# Patient Record
Sex: FEMALE | Race: WHITE
[De-identification: names, ages, dates, MRNs, and addresses within clinical notes are randomized per-mention and may not be internally consistent; named-entity substitution may affect disease eponyms.]

---

## 2017-04-17 ENCOUNTER — HOSPITAL ENCOUNTER (OUTPATIENT)
Dept: HOSPITAL 18 - NAVSJIPCSP | Age: 62
End: 2017-04-17
Payer: COMMERCIAL

## 2017-04-17 DIAGNOSIS — R39.9: Primary | ICD-10-CM

## 2017-04-17 LAB
ANION GAP SERPL CALC-SCNC: 15 MMOL/L (ref 10–20)
BASOPHILS # BLD AUTO: 0.2 THOU/UL (ref 0–0.2)
BASOPHILS NFR BLD AUTO: 1.1 % (ref 0–1)
BUN SERPL-MCNC: 16 MG/DL (ref 9.8–20.1)
CALCIUM SERPL-MCNC: 9.7 MG/DL (ref 7.8–10.44)
CHLORIDE SERPL-SCNC: 100 MMOL/L (ref 98–107)
CO2 SERPL-SCNC: 25 MMOL/L (ref 23–31)
CREAT CL PREDICTED SERPL C-G-VRATE: 0 ML/MIN (ref 70–130)
EOSINOPHIL # BLD AUTO: 0.2 THOU/UL (ref 0–0.7)
EOSINOPHIL NFR BLD AUTO: 1.3 % (ref 0–10)
GLUCOSE SERPL-MCNC: 143 MG/DL (ref 80–115)
HGB BLD-MCNC: 10.8 G/DL (ref 12–16)
LYMPHOCYTES # BLD AUTO: 2.7 THOU/UL (ref 1.2–3.4)
LYMPHOCYTES NFR BLD AUTO: 15 % (ref 21–51)
MCH RBC QN AUTO: 25.8 PG (ref 27–31)
MCV RBC AUTO: 83.5 FL (ref 81–99)
MONOCYTES # BLD AUTO: 1.3 THOU/UL (ref 0.11–0.59)
MONOCYTES NFR BLD AUTO: 7.2 % (ref 0–10)
NEUTROPHILS # BLD AUTO: 13.3 THOU/UL (ref 1.4–6.5)
NEUTROPHILS NFR BLD AUTO: 75.4 % (ref 42–75)
PLATELET # BLD AUTO: 436 THOU/UL (ref 130–400)
POTASSIUM SERPL-SCNC: 4.5 MMOL/L (ref 3.5–5.1)
RBC # BLD AUTO: 4.21 MILL/UL (ref 4.2–5.4)
RBC UR QL AUTO: (no result) HPF (ref 0–3)
SODIUM SERPL-SCNC: 135 MMOL/L (ref 136–145)
SP GR UR STRIP: 1.02 (ref 1–1.04)
UNIDENT CRYS #/AREA URNS HPF: (no result) HPF
WBC # BLD AUTO: 17.6 THOU/UL (ref 4.8–10.8)

## 2017-04-17 PROCEDURE — 85025 COMPLETE CBC W/AUTO DIFF WBC: CPT

## 2017-04-17 PROCEDURE — 80048 BASIC METABOLIC PNL TOTAL CA: CPT

## 2017-04-17 PROCEDURE — 81015 MICROSCOPIC EXAM OF URINE: CPT

## 2017-04-17 PROCEDURE — 87086 URINE CULTURE/COLONY COUNT: CPT

## 2017-04-17 PROCEDURE — 81003 URINALYSIS AUTO W/O SCOPE: CPT

## 2017-04-29 ENCOUNTER — HOSPITAL ENCOUNTER (EMERGENCY)
Dept: HOSPITAL 18 - NAV ERS | Age: 62
LOS: 1 days | Discharge: HOME | End: 2017-04-30
Payer: COMMERCIAL

## 2017-04-29 DIAGNOSIS — M81.0: ICD-10-CM

## 2017-04-29 DIAGNOSIS — R11.2: Primary | ICD-10-CM

## 2017-04-29 DIAGNOSIS — I10: ICD-10-CM

## 2017-04-29 DIAGNOSIS — Z79.899: ICD-10-CM

## 2017-04-29 LAB
ALBUMIN SERPL BCG-MCNC: 3.9 G/DL (ref 3.4–4.8)
ALP SERPL-CCNC: 81 U/L (ref 40–150)
ALT SERPL W P-5'-P-CCNC: 12 U/L (ref 0–55)
ANION GAP SERPL CALC-SCNC: 21 MMOL/L (ref 10–20)
AST SERPL-CCNC: 19 U/L (ref 5–34)
BILIRUB SERPL-MCNC: 0.4 MG/DL (ref 0.2–1.2)
BUN SERPL-MCNC: 11 MG/DL (ref 9.8–20.1)
CALCIUM SERPL-MCNC: 10.5 MG/DL (ref 7.8–10.44)
CHLORIDE SERPL-SCNC: 103 MMOL/L (ref 98–107)
CK MB SERPL-MCNC: 1.5 NG/ML (ref 0–6.6)
CO2 SERPL-SCNC: 17 MMOL/L (ref 23–31)
CREAT CL PREDICTED SERPL C-G-VRATE: 0 ML/MIN (ref 70–130)
GLOBULIN SER CALC-MCNC: 5.4 G/DL (ref 2.4–3.5)
GLUCOSE SERPL-MCNC: 153 MG/DL (ref 80–115)
HGB BLD-MCNC: 13 G/DL (ref 12–16)
LIPASE SERPL-CCNC: 32 U/L (ref 8–78)
MCH RBC QN AUTO: 25.4 PG (ref 27–31)
MCV RBC AUTO: 80.8 FL (ref 81–99)
MDIFF COMPLETE?: YES
PLATELET # BLD AUTO: 427 THOU/UL (ref 130–400)
PLATELET BLD QL SMEAR: (no result)
POTASSIUM SERPL-SCNC: 3.8 MMOL/L (ref 3.5–5.1)
RBC # BLD AUTO: 5.1 MILL/UL (ref 4.2–5.4)
SODIUM SERPL-SCNC: 137 MMOL/L (ref 136–145)
SP GR UR STRIP: 1 (ref 1–1.04)
TROPONIN I SERPL DL<=0.01 NG/ML-MCNC: 0.01 NG/ML (ref ?–0.03)
WBC # BLD AUTO: 21 THOU/UL (ref 4.8–10.8)

## 2017-04-29 PROCEDURE — 84484 ASSAY OF TROPONIN QUANT: CPT

## 2017-04-29 PROCEDURE — 96374 THER/PROPH/DIAG INJ IV PUSH: CPT

## 2017-04-29 PROCEDURE — 85025 COMPLETE CBC W/AUTO DIFF WBC: CPT

## 2017-04-29 PROCEDURE — 80053 COMPREHEN METABOLIC PANEL: CPT

## 2017-04-29 PROCEDURE — 93005 ELECTROCARDIOGRAM TRACING: CPT

## 2017-04-29 PROCEDURE — 74177 CT ABD & PELVIS W/CONTRAST: CPT

## 2017-04-29 PROCEDURE — 96361 HYDRATE IV INFUSION ADD-ON: CPT

## 2017-04-29 PROCEDURE — 83690 ASSAY OF LIPASE: CPT

## 2017-04-29 PROCEDURE — 82553 CREATINE MB FRACTION: CPT

## 2017-04-29 PROCEDURE — 36415 COLL VENOUS BLD VENIPUNCTURE: CPT

## 2017-04-29 PROCEDURE — 81003 URINALYSIS AUTO W/O SCOPE: CPT

## 2017-04-29 PROCEDURE — 96375 TX/PRO/DX INJ NEW DRUG ADDON: CPT

## 2017-04-29 PROCEDURE — 81015 MICROSCOPIC EXAM OF URINE: CPT

## 2017-04-29 PROCEDURE — 83605 ASSAY OF LACTIC ACID: CPT

## 2017-04-29 NOTE — CT
EXAM:

ABDOMEN CT WITH CONTRAST

PELVIC CT WITH CONTRAST

4/29/17

 

HISTORY: 

Abdominal pain. Crohn's disease. Vulvar adenocarcinoma. 

 

COMPARISON:  

6/23/16. 1/21/15.

 

TECHNIQUE:  

Abdomen and pelvic CT are performed with IV contrast. Coronal reformatted images are submitted for i
nterpretation.

 

FINDINGS:  

 

ABDOMEN CT:

The lung bases are clear. Heart size is upper normal. No pericardial effusion. Descending thoracic a
junaid and the abdominal aorta have a normal caliber. No periaortic fat stranding. 

 

Symmetric attenuation of the psoas muscles. Spleen is surgically absent. Splenule is noted. The panc
reas and adrenal glands have a appropriate enhancement. 

 

There is appropriate enhancement of the liver. There is some nodularity along the right hepatic lobe
 as well as the posterior peritoneum adjacent to the posterior segment of the right hepatic lobe. Th
azul densities are similar to the prior examination and may represent small splenules. 

 

Symmetric enhancement of the kidneys. Bilaterally, no obstructive uropathy. Note, the entire ureter 
is not appreciated due to extensive surgical clips in the pelvis. 

 

No mesenteric mass, lymphadenopathy, free air or free fluid. Stable ostomy in the right lower quadra
nt. 

 

Multiple proximal normal caliber small bowel loops. There does appear to be a transition in the over
all diameter of the small bowel loops (axial image #42, coronal image #34). Beyond the level of this
 transition, there are multiple fluid filled loops of bowel. Obstructive process cannot be completel
y excluded. 

 

Note, there are distal small bowel loops which are decompressed. There is a nonspecific hyperdensity
 in a segment of small bowel measuring 1.2 cm. 

 

PELVIC CT:

Limited evaluation due to beam attenuation artifact from surgical clips and a right hip prosthesis. 
Unrinary bladder is grossly unremarkable. There are multiple fluid filled loops of small bowel. 

 

IMPRESSION:  

1.      Extensive postsurgical changes in the pelvis rendering limited evaluation. There appear to b
e fluid filled loops of prominent small bowel, in the mid portion of the small bowel. Obstructive pr
ocess cannot be excluded. 

2.      Nonspecific hyperdensity in a segment of small bowel. 

3.      Multiple nodularities adjacent to the posterior segment of the right hepatic lobe and long t
he peritoneal margin in the posterior right upper quadrant. Findings are similar to the prior examin
ation. Possibility of small splenules is raised. 

 

POS: [facility]

## 2017-05-11 ENCOUNTER — HOSPITAL ENCOUNTER (OUTPATIENT)
Dept: HOSPITAL 18 - NAV ULT | Age: 62
Discharge: HOME | End: 2017-05-11
Attending: FAMILY MEDICINE
Payer: COMMERCIAL

## 2017-05-11 DIAGNOSIS — L03.115: Primary | ICD-10-CM

## 2017-05-11 PROCEDURE — 76881 US COMPL JOINT R-T W/IMG: CPT

## 2017-05-11 NOTE — ULT
RIGHT UPPER ANTERIOR THIGH ULTRASOUND EXTREMITY NONVASCULAR COMPLETE:

 

HISTORY: 

Cellulitis versus abscess.  The area has grown in size from a quarter in January 2017 to the size it
 is today.  It has worsened over the last 2 weeks.

 

COMPARISON: 

Within the right anterior thigh soft tissues, there appears to be a complex fluid collection measuri
ng 8 x 2.3 x 2.4 cm with a few areas of internal blood flow.

 

The surrounding soft tissues appear hyperemic.

 

IMPRESSION: 

Large collection along the right anterior thigh with a few areas of internal blood flow.  This may r
epresent a focal abscess given history of cellulitis.  Soft tissue metastasis is felt less likely.  
A real-time examination performed by the radiologist is recommended.  MRI with contrast may also be 
beneficial given the findings.

 

POS: [facility]

## 2017-06-19 ENCOUNTER — HOSPITAL ENCOUNTER (EMERGENCY)
Dept: HOSPITAL 18 - NAV ERS | Age: 62
LOS: 1 days | Discharge: ANOTHER HOSPITAL | End: 2017-06-20
Payer: COMMERCIAL

## 2017-06-19 DIAGNOSIS — Z87.442: ICD-10-CM

## 2017-06-19 DIAGNOSIS — F41.9: ICD-10-CM

## 2017-06-19 DIAGNOSIS — I10: ICD-10-CM

## 2017-06-19 DIAGNOSIS — Z79.899: ICD-10-CM

## 2017-06-19 DIAGNOSIS — M81.0: ICD-10-CM

## 2017-06-19 DIAGNOSIS — K50.90: ICD-10-CM

## 2017-06-19 DIAGNOSIS — K56.60: Primary | ICD-10-CM

## 2017-06-19 LAB
ALBUMIN SERPL BCG-MCNC: 3.9 G/DL (ref 3.4–4.8)
ALP SERPL-CCNC: 74 U/L (ref 40–150)
ALT SERPL W P-5'-P-CCNC: 13 U/L (ref 8–55)
AMYLASE SERPL-CCNC: 49 U/L (ref 25–125)
ANION GAP SERPL CALC-SCNC: 20 MMOL/L (ref 10–20)
AST SERPL-CCNC: 17 U/L (ref 5–34)
BACTERIA UR QL AUTO: (no result) HPF
BASOPHILS # BLD AUTO: 0.2 THOU/UL (ref 0–0.2)
BASOPHILS NFR BLD AUTO: 0.8 % (ref 0–1)
BILIRUB SERPL-MCNC: 0.6 MG/DL (ref 0.2–1.2)
BUN SERPL-MCNC: 18 MG/DL (ref 9.8–20.1)
CALCIUM SERPL-MCNC: 10.7 MG/DL (ref 7.8–10.44)
CHLORIDE SERPL-SCNC: 96 MMOL/L (ref 98–107)
CO2 SERPL-SCNC: 22 MMOL/L (ref 23–31)
CREAT CL PREDICTED SERPL C-G-VRATE: 0 ML/MIN (ref 70–130)
EOSINOPHIL # BLD AUTO: 0 THOU/UL (ref 0–0.7)
EOSINOPHIL NFR BLD AUTO: 0.1 % (ref 0–10)
GLOBULIN SER CALC-MCNC: 5.1 G/DL (ref 2.4–3.5)
GLUCOSE SERPL-MCNC: 124 MG/DL (ref 80–115)
HGB BLD-MCNC: 13.1 G/DL (ref 12–16)
LIPASE SERPL-CCNC: 20 U/L (ref 8–78)
LYMPHOCYTES # BLD AUTO: 2.4 THOU/UL (ref 1.2–3.4)
LYMPHOCYTES NFR BLD AUTO: 12.3 % (ref 21–51)
MCH RBC QN AUTO: 24.8 PG (ref 27–31)
MCV RBC AUTO: 82.5 FL (ref 81–99)
MDIFF COMPLETE?: YES
MONOCYTES # BLD AUTO: 1.2 THOU/UL (ref 0.11–0.59)
MONOCYTES NFR BLD AUTO: 6 % (ref 0–10)
NEUTROPHILS # BLD AUTO: 15.9 THOU/UL (ref 1.4–6.5)
NEUTROPHILS NFR BLD AUTO: 80.9 % (ref 42–75)
PLATELET # BLD AUTO: 448 THOU/UL (ref 130–400)
PLATELET BLD QL SMEAR: (no result)
POIKILOCYTOSIS BLD QL SMEAR: (no result) (100X)
POTASSIUM SERPL-SCNC: 4 MMOL/L (ref 3.5–5.1)
PROT UR STRIP.AUTO-MCNC: 100 MG/DL
RBC # BLD AUTO: 5.29 MILL/UL (ref 4.2–5.4)
RBC UR QL AUTO: (no result) HPF (ref 0–3)
SODIUM SERPL-SCNC: 134 MMOL/L (ref 136–145)
SP GR UR STRIP: 1.02 (ref 1–1.03)
WBC # BLD AUTO: 19.7 THOU/UL (ref 4.8–10.8)
WBC UR QL AUTO: (no result) HPF (ref 0–3)

## 2017-06-19 PROCEDURE — 83690 ASSAY OF LIPASE: CPT

## 2017-06-19 PROCEDURE — 85025 COMPLETE CBC W/AUTO DIFF WBC: CPT

## 2017-06-19 PROCEDURE — 96375 TX/PRO/DX INJ NEW DRUG ADDON: CPT

## 2017-06-19 PROCEDURE — 81003 URINALYSIS AUTO W/O SCOPE: CPT

## 2017-06-19 PROCEDURE — 81015 MICROSCOPIC EXAM OF URINE: CPT

## 2017-06-19 PROCEDURE — 36415 COLL VENOUS BLD VENIPUNCTURE: CPT

## 2017-06-19 PROCEDURE — 96376 TX/PRO/DX INJ SAME DRUG ADON: CPT

## 2017-06-19 PROCEDURE — 74176 CT ABD & PELVIS W/O CONTRAST: CPT

## 2017-06-19 PROCEDURE — 74177 CT ABD & PELVIS W/CONTRAST: CPT

## 2017-06-19 PROCEDURE — 80053 COMPREHEN METABOLIC PANEL: CPT

## 2017-06-19 PROCEDURE — 83605 ASSAY OF LACTIC ACID: CPT

## 2017-06-19 PROCEDURE — 82150 ASSAY OF AMYLASE: CPT

## 2017-06-19 PROCEDURE — 96374 THER/PROPH/DIAG INJ IV PUSH: CPT

## 2017-06-19 PROCEDURE — 96361 HYDRATE IV INFUSION ADD-ON: CPT

## 2017-06-19 PROCEDURE — 74022 RADEX COMPL AQT ABD SERIES: CPT

## 2017-06-19 NOTE — RAD
FRONTAL RADIOGRAPH CHEST

 

06/19/2017

 

HISTORY:

Lower abdominal cramping.  Nausea and vomiting.

 

COMPARISON: 

06/23/2016

 

TECHNIQUE:

Upright and supine frontal imaging of the abdomen and pelvis.

 

FINDINGS:

Frontal radiograph chest demonstrates no pneumothorax or pleural fluid.  No focal consolidation or a
lveolar edema.

 

Upright imaging demonstrates no free intraperitoneal air.

 

As seen on the prior examination, there is a paucity of bowel gas.  There are postoperative clips in
 the pelvis.  There is a hip arthroplasty on the right.  There is degenerative change and probable p
ost traumatic change at the level of the pubic symphysis, stable.

 

There is a suggestion of possible mild bowel wall thickening in the mid left abdomen, inferiorly, no
nspecific.  A partially imaged ostomy is suspected in the left lower quadrant.

 

IMPRESSION:

Paucity of bowel gas noted.  No free intraperitoneal air or evidence for acute cardiopulmonary disea
se.  Question mild bowel wall thickening in the left lower quadrant.  CT A/P may be beneficial.

 

POS: [facility]

## 2017-06-19 NOTE — CT
ABDOMEN AND PELVIS CT WITHOUT CONTRAST

 

06/19/2017

 

HISTORY:

Lower abdominal pain and cramping.

 

COMPARISON:

04/29/2017

 

TECHNIQUE:

Serial axial CT imaging obtained at 5 mm intervals, from the lung bases through the pubic symphysis,
 without contrast.  Coronal reformatted imaging obtained.

 

FINDINGS:

Lack of contrast limits assessment of the viscera, bowel, and vascular structures, and for lymphaden
opathy.

 

Linear scar and/or volume loss noted within the lateral aspect of the imaged left lung base, stable.
  There is no free intraperitoneal air.

 

Limited assessment of the liver demonstrates granulomata.  The gallbladder, pancreas, and adrenal gl
ands are unremarkable.

 

The spleen is not visualized.  There are nodular densities within the bilateral upper quadrants.  Th
azul nodular densities are stable.  Given nonvisualization of a normal spleen, this suggests splenosi
s.

 

There is a punctate, nonobstructing stone within the mid pole of the right kidney.

 

There is no evidence for obstructive uropathy on either side.

 

There are post surgical clips in the pre-sacral space with associated streak artifact, limiting deta
iled assessment.  Streak artifact is also seen involving the right hemipelvis, on the basis of a rig
ht hip prosthesis.  Bowel is poorly assessed secondary to the lack of contrast media.  There are num
erous fluid-filled dilated loops of small bowel within the mid left abdomen/left lower quadrant, the
 pelvis, and the right lower quadrant, demonstrating a distribution, configuration, and degree of di
lation, similar when compared to the 04/29/2017 exam.  The colon appears surgically absent.

 

There are numerous decompressed distal small bowel loops within the right upper quadrant, the anteri
or aspect of the mid right abdomen, and in the region of a right lower quadrant ostomy, with a simil
ar differential size when compared to the prior exam.

 

There is atherosclerotic calcification of the abdominal aorta and its branches, not well characteriz
ed without contrast media.  There are severe degenerative changes involving the right sacroiliac cady
nt, stable.  There are degenerative and remote post traumatic changes of the pubic symphysis.  There
 is lower lumbar spine facet hypertrophic change.

 

IMPRESSION:

Stable CT of the abdomen/pelvis, as detailed above.  Numerous fluid-filled loops of prominent proxim
al small bowel noted, with distal decompressed small bowel.  An obstructive process cannot be exclud
ed.  Of note, this is unchanged when compared to the 04/29/2017 examination and may represent chroni
c obstruction and/or a recurrent small bowel obstruction.  Clinical correlation is essential.

 

POS: [facility]

## 2017-06-20 NOTE — CT
PRELIMINARY REPORT/[facility]/EMERGENCY AFTER

HOURS PROCEDURE:

 

EXAM:

CT Abdomen and Pelvis With Intravenous Contrast

 

CLINICAL HISTORY:

62 years old, female; Signs and symptoms; Nausea and vomiting; Prior surgery; Surgery date: 6+ month
s; Surgery type: Splenectomy, colon resection, hysterectomy, hip replacement, ostomy; Additional inf
o: HX of sbo, HX of inflammatory bowel disease

 

TECHNIQUE:

Axial computed tomography images of the abdomen and pelvis with intravenous contrast. This CT exam w
as performed using one or more of the following dose reduction techniques: automated exposure contro
l, adjustment of the mA and/or kV according to patient size, and/or use of iterative reconstruction 
technique.

Coronal and sagittal reformatted images were created and reviewed.

 

CONTRAST:

95 mL of ISOVUE 370 administered intravenously.

 

EXAM DATE/TIME:

Exam ordered 6/20/2017 2:02 AM

 

COMPARISON:

No relevant prior studies available.

 

FINDINGS:

Lower thorax: No acute findings.

 

ABDOMEN:

Liver: Unremarkable. No mass.

Gallbladder and bile ducts: Unremarkable. No calcified stones. No ductal dilation.

Pancreas: Unremarkable. No mass. No ductal dilation.

Spleen: Proper spleen is absent. Multiple small splenules in its place.

Adrenals: Unremarkable. No mass.

Kidneys and ureters: Unremarkable. No solid mass. No hydronephrosis.

Stomach and bowel: High-grade distal small bowel obstruction with transition point in the right lowe
r quadrant. Prior colectomy. Right lower quadrant ileostomy. No bowel wall thickening.

Appendix: See above.

 

PELVIS:

Bladder: Unremarkable. No mass.

Reproductive: Unremarkable as visualized.

 

ABDOMEN and PELVIS:

Intraperitoneal space: Trace volume ascites. No pneumoperitoneum or abscess.

Bones/joints: Right hip prosthesis. No acute fracture. No dislocation.

Soft tissues: Unremarkable.

Vasculature: Unremarkable. No abdominal aortic aneurysm.

Lymph nodes: Unremarkable. No enlarged lymph nodes.

Other findings: Chronic posttraumatic change of the pelvis.

 

IMPRESSION:

High-grade distal small bowel obstruction with transition point in the right lower quadrant.

 

Thank you for allowing us to participate in the care of your patient.

 

Dictated and Authenticated by: Dheeraj Fonseca MD

06/20/2017 2:56 AM Central Time ([place] \[place]\ [place])

 

 

 

 

 

 

 

                          FINAL REPORT

 

CT ABDOMEN AND PELVIS WITH CONTRAST:

 

HISTORY: 

Crohn's disease and vulvar adenocarcinoma.  History of ileostomy, hysterectomy, splenectomy, and rig
ht hip replacement.  Nonspecific lesion in Morison's pouch.

 

COMPARISON: 

CT abdomen.

 

FINDINGS: 

Lung bases are clear.  No pericardial effusion.

 

There are multiple hyperattenuating nodules scattered throughout the abdomen.  These are likely smal
l remnant splenic nodules from prior splenectomy.

 

There is some inflammation within the distal small bowel mesentery.  There are numerous dilated loop
s of small bowel without a single transition point, although there are multiple areas of narrowing a
nd stricturing.  The distal small bowel is collapsed.

 

There are multiple areas of small bowel wall hyperenhancement and thickening.

 

There is small-volume fluid within the pelvic cul-de-sac.

 

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  There are small volume ero
sions of the left SI joint.  There is severe degenerative disease of the left hip joint with narrowi
ng and large subchondral cyst formation.  There is nonhealing fracture of the left pubic body and in
ferior pubic ramus.

 

IMPRESSION: 

1.  Evidence for low-grade small bowel obstruction similar to the comparison examination yesterday a
s well as from April 2017.  There is likely sequelae of chronic inflammatory bowel disease given the
 stricturing and thickening of numerous loops of small bowel.  There is also mild inflammatory stran
ding within the mesentery and small volume fluid in the cul-de-sac.

2.  Erosive changes of both sacroiliac joints is likely a component of underlying avascular necrosis
.

3.  Nonunion left pubic body and anterior pubic ramus fractures with remodeling of the right pubic b
vicenta.

 

 

I agree with the majority of the preliminary report. CODE: QA

 

POS: [facility]

## 2017-12-11 ENCOUNTER — HOSPITAL ENCOUNTER (OUTPATIENT)
Dept: HOSPITAL 92 - WCC | Age: 62
Discharge: HOME | End: 2017-12-11
Attending: FAMILY MEDICINE
Payer: COMMERCIAL

## 2017-12-11 DIAGNOSIS — K94.00: Primary | ICD-10-CM

## 2017-12-11 PROCEDURE — G0463 HOSPITAL OUTPT CLINIC VISIT: HCPCS

## 2017-12-11 PROCEDURE — 99211 OFF/OP EST MAY X REQ PHY/QHP: CPT

## 2019-01-25 ENCOUNTER — HOSPITAL ENCOUNTER (OUTPATIENT)
Dept: HOSPITAL 92 - BICMAMMO | Age: 64
Discharge: HOME | End: 2019-01-25
Attending: INTERNAL MEDICINE
Payer: COMMERCIAL

## 2019-01-25 DIAGNOSIS — M81.0: Primary | ICD-10-CM

## 2019-01-25 DIAGNOSIS — M85.88: ICD-10-CM

## 2019-01-25 PROCEDURE — 77080 DXA BONE DENSITY AXIAL: CPT

## 2019-01-25 NOTE — BD
DEXA BONE DENSITY STUDY:

 

HISTORY: 

Postmenopausal.

 

FINDINGS: 

 

Lumbar Spine:       BMD (g/cm2)

    L1                0.868              T-Score: -1.1

    L2                0.878              T-Score: -1.4

    L3                0.864              T-Score: -2.0

    L4                0.873              T-Score: -1.7

 

    L1-L4             0.871              T-Score: -1.6

 

Femoral Neck:         0.754              T-Score: -0.9

 

Total Femur:          0.843              T-Score: -0.8

 

Impression:

Normal bone mineral density of the left femoral neck and osteopenia of the lumbar spine.

 

POS: [facility]

## 2019-07-21 ENCOUNTER — HOSPITAL ENCOUNTER (EMERGENCY)
Dept: HOSPITAL 18 - NAV ERS | Age: 64
LOS: 1 days | Discharge: ANOTHER HOSPITAL | End: 2019-07-22
Payer: COMMERCIAL

## 2019-07-21 DIAGNOSIS — F41.9: ICD-10-CM

## 2019-07-21 DIAGNOSIS — Z79.899: ICD-10-CM

## 2019-07-21 DIAGNOSIS — Z87.442: ICD-10-CM

## 2019-07-21 DIAGNOSIS — K56.699: Primary | ICD-10-CM

## 2019-07-21 DIAGNOSIS — I10: ICD-10-CM

## 2019-07-21 LAB
ALBUMIN SERPL BCG-MCNC: 4.7 G/DL (ref 3.4–4.8)
ALP SERPL-CCNC: 78 U/L (ref 40–150)
ALT SERPL W P-5'-P-CCNC: 24 U/L (ref 8–55)
ANION GAP SERPL CALC-SCNC: 22 MMOL/L (ref 10–20)
ANISOCYTOSIS BLD QL SMEAR: (no result) (100X)
AST SERPL-CCNC: 22 U/L (ref 5–34)
BASOPHILS # BLD AUTO: 0.2 THOU/UL (ref 0–0.2)
BASOPHILS NFR BLD AUTO: 0.7 % (ref 0–1)
BILIRUB SERPL-MCNC: 0.6 MG/DL (ref 0.2–1.2)
BUN SERPL-MCNC: 14 MG/DL (ref 9.8–20.1)
CALCIUM SERPL-MCNC: 11.5 MG/DL (ref 7.8–10.44)
CHLORIDE SERPL-SCNC: 102 MMOL/L (ref 98–107)
CO2 SERPL-SCNC: 17 MMOL/L (ref 23–31)
CREAT CL PREDICTED SERPL C-G-VRATE: 0 ML/MIN (ref 70–130)
EOSINOPHIL # BLD AUTO: 0 THOU/UL (ref 0–0.7)
EOSINOPHIL NFR BLD AUTO: 0 % (ref 0–10)
GLOBULIN SER CALC-MCNC: 5.3 G/DL (ref 2.4–3.5)
GLUCOSE SERPL-MCNC: 176 MG/DL (ref 80–115)
HGB BLD-MCNC: 15.8 G/DL (ref 12–16)
LIPASE SERPL-CCNC: 45 U/L (ref 8–78)
LYMPHOCYTES # BLD AUTO: 2.5 THOU/UL (ref 1.2–3.4)
LYMPHOCYTES NFR BLD AUTO: 10.3 % (ref 21–51)
MANUAL DIF COMMENT BLD-IMP: (no result)
MCH RBC QN AUTO: 26.7 PG (ref 27–31)
MCV RBC AUTO: 86.4 FL (ref 78–98)
MONOCYTES # BLD AUTO: 1.1 THOU/UL (ref 0.11–0.59)
MONOCYTES NFR BLD AUTO: 4.4 % (ref 0–10)
NEUTROPHILS # BLD AUTO: 20.4 THOU/UL (ref 1.4–6.5)
NEUTROPHILS NFR BLD AUTO: 84.6 % (ref 42–75)
PLATELET # BLD AUTO: 354 THOU/UL (ref 130–400)
POTASSIUM SERPL-SCNC: 3.8 MMOL/L (ref 3.5–5.1)
RBC # BLD AUTO: 5.91 MILL/UL (ref 4.2–5.4)
SODIUM SERPL-SCNC: 137 MMOL/L (ref 136–145)
TARGETS BLD QL SMEAR: (no result) (100X)
WBC # BLD AUTO: 24.1 THOU/UL (ref 4.8–10.8)

## 2019-07-21 PROCEDURE — 84484 ASSAY OF TROPONIN QUANT: CPT

## 2019-07-21 PROCEDURE — 93005 ELECTROCARDIOGRAM TRACING: CPT

## 2019-07-21 PROCEDURE — 36415 COLL VENOUS BLD VENIPUNCTURE: CPT

## 2019-07-21 PROCEDURE — 96361 HYDRATE IV INFUSION ADD-ON: CPT

## 2019-07-21 PROCEDURE — 83690 ASSAY OF LIPASE: CPT

## 2019-07-21 PROCEDURE — 87040 BLOOD CULTURE FOR BACTERIA: CPT

## 2019-07-21 PROCEDURE — 96374 THER/PROPH/DIAG INJ IV PUSH: CPT

## 2019-07-21 PROCEDURE — 80053 COMPREHEN METABOLIC PANEL: CPT

## 2019-07-21 PROCEDURE — 85025 COMPLETE CBC W/AUTO DIFF WBC: CPT

## 2019-07-21 PROCEDURE — 74177 CT ABD & PELVIS W/CONTRAST: CPT

## 2019-07-21 PROCEDURE — 94760 N-INVAS EAR/PLS OXIMETRY 1: CPT

## 2019-07-21 PROCEDURE — 74022 RADEX COMPL AQT ABD SERIES: CPT

## 2019-07-21 PROCEDURE — 83605 ASSAY OF LACTIC ACID: CPT

## 2019-07-21 PROCEDURE — 96375 TX/PRO/DX INJ NEW DRUG ADDON: CPT

## 2019-07-24 ENCOUNTER — HOSPITAL ENCOUNTER (EMERGENCY)
Dept: HOSPITAL 18 - NAV ERS | Age: 64
Discharge: HOME | End: 2019-07-24
Payer: COMMERCIAL

## 2019-07-24 DIAGNOSIS — Z79.899: ICD-10-CM

## 2019-07-24 DIAGNOSIS — I10: ICD-10-CM

## 2019-07-24 DIAGNOSIS — R11.2: Primary | ICD-10-CM

## 2019-07-24 DIAGNOSIS — R10.84: ICD-10-CM

## 2019-07-24 DIAGNOSIS — K58.9: ICD-10-CM

## 2019-07-24 DIAGNOSIS — D72.829: ICD-10-CM

## 2019-07-24 DIAGNOSIS — F41.9: ICD-10-CM

## 2019-07-24 DIAGNOSIS — Z87.442: ICD-10-CM

## 2019-07-24 LAB
ALBUMIN SERPL BCG-MCNC: 4.1 G/DL (ref 3.4–4.8)
ALP SERPL-CCNC: 61 U/L (ref 40–150)
ALT SERPL W P-5'-P-CCNC: 20 U/L (ref 8–55)
ANION GAP SERPL CALC-SCNC: 17 MMOL/L (ref 10–20)
AST SERPL-CCNC: 19 U/L (ref 5–34)
BASOPHILS # BLD AUTO: 0.1 THOU/UL (ref 0–0.2)
BASOPHILS NFR BLD AUTO: 0.8 % (ref 0–1)
BILIRUB SERPL-MCNC: 0.5 MG/DL (ref 0.2–1.2)
BUN SERPL-MCNC: 11 MG/DL (ref 9.8–20.1)
CALCIUM SERPL-MCNC: 10.3 MG/DL (ref 7.8–10.44)
CHLORIDE SERPL-SCNC: 104 MMOL/L (ref 98–107)
CO2 SERPL-SCNC: 21 MMOL/L (ref 23–31)
CREAT CL PREDICTED SERPL C-G-VRATE: 0 ML/MIN (ref 70–130)
EOSINOPHIL # BLD AUTO: 0 THOU/UL (ref 0–0.7)
EOSINOPHIL NFR BLD AUTO: 0 % (ref 0–10)
GLOBULIN SER CALC-MCNC: 4.3 G/DL (ref 2.4–3.5)
GLUCOSE SERPL-MCNC: 133 MG/DL (ref 80–115)
HGB BLD-MCNC: 13.2 G/DL (ref 12–16)
LYMPHOCYTES # BLD AUTO: 1.6 THOU/UL (ref 1.2–3.4)
LYMPHOCYTES NFR BLD AUTO: 9.4 % (ref 21–51)
MCH RBC QN AUTO: 26.3 PG (ref 27–31)
MCV RBC AUTO: 86.8 FL (ref 78–98)
MONOCYTES # BLD AUTO: 0.9 THOU/UL (ref 0.11–0.59)
MONOCYTES NFR BLD AUTO: 5.3 % (ref 0–10)
NEUTROPHILS # BLD AUTO: 14.1 THOU/UL (ref 1.4–6.5)
NEUTROPHILS NFR BLD AUTO: 84.4 % (ref 42–75)
PLATELET # BLD AUTO: 344 THOU/UL (ref 130–400)
POTASSIUM SERPL-SCNC: 3.8 MMOL/L (ref 3.5–5.1)
RBC # BLD AUTO: 5.04 MILL/UL (ref 4.2–5.4)
SODIUM SERPL-SCNC: 138 MMOL/L (ref 136–145)
WBC # BLD AUTO: 16.6 THOU/UL (ref 4.8–10.8)

## 2019-07-24 PROCEDURE — 85025 COMPLETE CBC W/AUTO DIFF WBC: CPT

## 2019-07-24 PROCEDURE — 96374 THER/PROPH/DIAG INJ IV PUSH: CPT

## 2019-07-24 PROCEDURE — 83605 ASSAY OF LACTIC ACID: CPT

## 2019-07-24 PROCEDURE — 96361 HYDRATE IV INFUSION ADD-ON: CPT

## 2019-07-24 PROCEDURE — 96376 TX/PRO/DX INJ SAME DRUG ADON: CPT

## 2019-07-24 PROCEDURE — 96375 TX/PRO/DX INJ NEW DRUG ADDON: CPT

## 2019-07-24 PROCEDURE — 80053 COMPREHEN METABOLIC PANEL: CPT

## 2019-07-24 PROCEDURE — 74022 RADEX COMPL AQT ABD SERIES: CPT

## 2019-07-24 NOTE — RAD
Frontal radiograph chest

2 views of abdomen:

7/24/2019



COMPARISON: 7/21/2019



HISTORY: Small bowel obstruction, Crohn's disease, ileostomy



FINDINGS: Upright radiograph of the chest demonstrates no pneumothorax, pleural fluid, focal consolid
ation, or alveolar edema.



No free intraperitoneal air is noted on upright imaging.



Evaluation of the bowel gas pattern is limited as there is very small volume gas within bowel on this
 exam. There is a right lower quadrant ostomy. There are postoperative clips within the pelvis.



On the supine radiograph there is a mildly distended loop of small bowel within the mid left lower qu
adrant measuring approximately 4 mm. When compared to radiographs performed 7/21/2019 there has

been no significant interval change. However, a CT examination performed 7/21/2019 demonstrated evide
nce of bowel obstruction with gas-filled dilated loops of small bowel within the left lower

quadrant/left midabdomen.



IMPRESSION: Mild residual small bowel distention in the left lower quadrant, which appears significan
tly improved when compared to 7/21/2019. CT examination would be required for full assessment, but

no interval worsening in gas-filled dilated small bowel seen.



Reported By: Marcus Mercedes 

Electronically Signed:  7/24/2019 11:52 AM

## 2019-10-07 ENCOUNTER — HOSPITAL ENCOUNTER (EMERGENCY)
Dept: HOSPITAL 18 - NAV ERS | Age: 64
Discharge: HOME | End: 2019-10-07
Payer: COMMERCIAL

## 2019-10-07 DIAGNOSIS — F41.9: ICD-10-CM

## 2019-10-07 DIAGNOSIS — I10: ICD-10-CM

## 2019-10-07 DIAGNOSIS — Z79.899: ICD-10-CM

## 2019-10-07 DIAGNOSIS — K91.0: Primary | ICD-10-CM

## 2019-10-07 LAB
ALBUMIN SERPL BCG-MCNC: 4.4 G/DL (ref 3.4–4.8)
ALP SERPL-CCNC: 91 U/L (ref 40–110)
ALT SERPL W P-5'-P-CCNC: 24 U/L (ref 8–55)
ANION GAP SERPL CALC-SCNC: 20 MMOL/L (ref 10–20)
AST SERPL-CCNC: 25 U/L (ref 5–34)
BASOPHILS # BLD AUTO: 0.1 THOU/UL (ref 0–0.2)
BASOPHILS NFR BLD AUTO: 0.4 % (ref 0–1)
BILIRUB SERPL-MCNC: 0.4 MG/DL (ref 0.2–1.2)
BUN SERPL-MCNC: 12 MG/DL (ref 9.8–20.1)
CALCIUM SERPL-MCNC: 10.5 MG/DL (ref 7.8–10.44)
CHLORIDE SERPL-SCNC: 102 MMOL/L (ref 98–107)
CO2 SERPL-SCNC: 18 MMOL/L (ref 23–31)
CREAT CL PREDICTED SERPL C-G-VRATE: 0 ML/MIN (ref 70–130)
EOSINOPHIL # BLD AUTO: 0 THOU/UL (ref 0–0.7)
EOSINOPHIL NFR BLD AUTO: 0 % (ref 0–10)
GLOBULIN SER CALC-MCNC: 5.1 G/DL (ref 2.4–3.5)
GLUCOSE SERPL-MCNC: 183 MG/DL (ref 80–115)
HGB BLD-MCNC: 14 G/DL (ref 12–16)
LIPASE SERPL-CCNC: 30 U/L (ref 8–78)
LYMPHOCYTES # BLD AUTO: 1.9 THOU/UL (ref 1.2–3.4)
LYMPHOCYTES NFR BLD AUTO: 9.9 % (ref 21–51)
MCH RBC QN AUTO: 25.4 PG (ref 27–31)
MCV RBC AUTO: 84.9 FL (ref 78–98)
MDIFF COMPLETE?: YES
MONOCYTES # BLD AUTO: 0.3 THOU/UL (ref 0.11–0.59)
MONOCYTES NFR BLD AUTO: 1.8 % (ref 0–10)
NEUTROPHILS # BLD AUTO: 16.3 THOU/UL (ref 1.4–6.5)
NEUTROPHILS NFR BLD AUTO: 87.9 % (ref 42–75)
PLATELET # BLD AUTO: 381 THOU/UL (ref 130–400)
POTASSIUM SERPL-SCNC: 3.8 MMOL/L (ref 3.5–5.1)
PROT UR STRIP.AUTO-MCNC: 100 MG/DL
RBC # BLD AUTO: 5.49 MILL/UL (ref 4.2–5.4)
SODIUM SERPL-SCNC: 136 MMOL/L (ref 136–145)
WBC # BLD AUTO: 18.6 THOU/UL (ref 4.8–10.8)
WBC UR QL AUTO: (no result) HPF (ref 0–3)

## 2019-10-07 PROCEDURE — 83690 ASSAY OF LIPASE: CPT

## 2019-10-07 PROCEDURE — 81015 MICROSCOPIC EXAM OF URINE: CPT

## 2019-10-07 PROCEDURE — 96374 THER/PROPH/DIAG INJ IV PUSH: CPT

## 2019-10-07 PROCEDURE — 85025 COMPLETE CBC W/AUTO DIFF WBC: CPT

## 2019-10-07 PROCEDURE — 96376 TX/PRO/DX INJ SAME DRUG ADON: CPT

## 2019-10-07 PROCEDURE — 81003 URINALYSIS AUTO W/O SCOPE: CPT

## 2019-10-07 PROCEDURE — 74177 CT ABD & PELVIS W/CONTRAST: CPT

## 2019-10-07 PROCEDURE — 96375 TX/PRO/DX INJ NEW DRUG ADDON: CPT

## 2019-10-07 PROCEDURE — 80053 COMPREHEN METABOLIC PANEL: CPT

## 2019-10-07 PROCEDURE — 96361 HYDRATE IV INFUSION ADD-ON: CPT

## 2019-10-07 PROCEDURE — 83605 ASSAY OF LACTIC ACID: CPT

## 2019-10-07 NOTE — CT
PRELIMINARY REPORT/[facility]/EMERGENCY AFTER

HOURS PROCEDURE: 

 

PROCEDURE INFORMATION:

Exam: CT Abdomen And Pelvis With Contrast

 

Exam date and time: 10/7/2019 6:07 AM

 

Clinical history: 64 years old, female; Abdominal pain; Generalized; Prior surgery; Surgery date: 6+ 
months; Surgery type: Exploratory laparotomy and splenectomy for trauma in , . partial colectomy 
(entire large bowel) with subsequent apr and permanent ileostomy in . Right total hip

replacement and tubal ligation. Right, open removal of kidney stone with postoperative wound infectio
n. . ; Patient HX: PT with abd cramping and vomiting since last pm. Has a history of

crohns and sbo for which she is scheduled surgery in 2 weeks. Last seen 2 months ago for the same and
 sent to [facility].

 

TECHNIQUE:

Imaging protocol: Computed tomography of the abdomen and pelvis with intravenous contrast.

Radiation optimization: All CT scans at this facility use at least one of these dose optimization dariana
hniques: automated exposure control; mA and/or kV adjustment per patient size (includes targeted exam
s where dose is matched to clinical indication); or iterative reconstruction. 

Contrast material: ISOVUE 370; Contrast volume: 96 ml; Contrast route: LT AC;

 

COMPARISON:

No relevant prior studies available.

 

FINDINGS:

Lungs: Mild bibasal subsegmental atelectasis.

Liver: Hepatic steatosis.

Gallbladder and bile ducts: Unremarkable.

Pancreas: Unremarkable.

Spleen: Splenectomy.

Adrenals: Unremarkable.

Kidneys and ureters: Unremarkable.

Stomach and bowel: Colectomy. Right lower quadrant ileostomy. Multiple dilated loops of small bowel w
ith change in caliber anteriorly in the lower pelvis (80, coronal 53-66). Other than a short segment 
of mildly dilated small bowel just beyond the transition point, remainder of the bowel in the

right abdomen is decompressed. Oral contrast has not yet reached the transition zone. No definite bow
el wall thickening. Minimal mesenteric haziness in the right lower quadrant without inflammatory brock
ges otherwise identified.

Appendix: No evidence of appendicitis.

Intraperitoneal space: No significant fluid collection. No free air.

Vasculature: Unremarkable.

Lymph nodes: No enlarged lymph nodes.

Bladder: Underdistended.

Reproductive: Hysterectomy.

Bones/joints: Total right hip arthroplasty with associated streak artifact in the pelvis.

Soft tissues: Unremarkable.

 

IMPRESSION:

Findings suggestive of small bowel obstruction.

 

Thank you for allowing us to participate in the care of your patient.

Dictated and Authenticated by: Wilner Mathew MD

10/07/2019 6:39 AM Central Time ([place] & [place])

 

 

 

FINAL REPORT

 

EMERGENCY AFTER HOURS CT ABDOMEN AND PELVIS WITH IV AND ORAL CONTRAST:

 

Date:  10/07/19 

 

FINDINGS/IMPRESSION: 

I agree with the preliminary report given by Dr. Wilner Matehw of [facility]. 

 

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 

 

 

 

 

POS: [facility]

## 2019-11-01 ENCOUNTER — HOSPITAL ENCOUNTER (EMERGENCY)
Dept: HOSPITAL 18 - NAV ERS | Age: 64
Discharge: HOME | End: 2019-11-01
Payer: COMMERCIAL

## 2019-11-01 DIAGNOSIS — I10: ICD-10-CM

## 2019-11-01 DIAGNOSIS — F41.9: ICD-10-CM

## 2019-11-01 DIAGNOSIS — R11.2: ICD-10-CM

## 2019-11-01 DIAGNOSIS — Z79.899: ICD-10-CM

## 2019-11-01 DIAGNOSIS — E86.0: Primary | ICD-10-CM

## 2019-11-01 LAB
ALBUMIN SERPL BCG-MCNC: 4.2 G/DL (ref 3.4–4.8)
ALP SERPL-CCNC: 91 U/L (ref 40–110)
ALT SERPL W P-5'-P-CCNC: 21 U/L (ref 8–55)
ANION GAP SERPL CALC-SCNC: 15 MMOL/L (ref 10–20)
ANION GAP SERPL CALC-SCNC: 19 MMOL/L (ref 10–20)
AST SERPL-CCNC: 20 U/L (ref 5–34)
BASOPHILS # BLD AUTO: 0.1 THOU/UL (ref 0–0.2)
BASOPHILS NFR BLD AUTO: 0.6 % (ref 0–1)
BILIRUB SERPL-MCNC: 0.5 MG/DL (ref 0.2–1.2)
BUN SERPL-MCNC: 20 MG/DL (ref 9.8–20.1)
BUN SERPL-MCNC: 22 MG/DL (ref 9.8–20.1)
CALCIUM SERPL-MCNC: 10.2 MG/DL (ref 7.8–10.44)
CALCIUM SERPL-MCNC: 8.7 MG/DL (ref 7.8–10.44)
CHLORIDE SERPL-SCNC: 101 MMOL/L (ref 98–107)
CHLORIDE SERPL-SCNC: 105 MMOL/L (ref 98–107)
CO2 SERPL-SCNC: 13 MMOL/L (ref 23–31)
CO2 SERPL-SCNC: 17 MMOL/L (ref 23–31)
CREAT CL PREDICTED SERPL C-G-VRATE: 0 ML/MIN (ref 70–130)
CREAT CL PREDICTED SERPL C-G-VRATE: 0 ML/MIN (ref 70–130)
EOSINOPHIL # BLD AUTO: 0 THOU/UL (ref 0–0.7)
EOSINOPHIL NFR BLD AUTO: 0.2 % (ref 0–10)
GLOBULIN SER CALC-MCNC: 4.7 G/DL (ref 2.4–3.5)
GLUCOSE SERPL-MCNC: 115 MG/DL (ref 80–115)
GLUCOSE SERPL-MCNC: 145 MG/DL (ref 80–115)
HGB BLD-MCNC: 13.6 G/DL (ref 12–16)
LYMPHOCYTES # BLD AUTO: 2.1 THOU/UL (ref 1.2–3.4)
LYMPHOCYTES NFR BLD AUTO: 13.3 % (ref 21–51)
MCH RBC QN AUTO: 26 PG (ref 27–31)
MCV RBC AUTO: 84 FL (ref 78–98)
MONOCYTES # BLD AUTO: 1.1 THOU/UL (ref 0.11–0.59)
MONOCYTES NFR BLD AUTO: 6.8 % (ref 0–10)
NEUTROPHILS # BLD AUTO: 12.5 THOU/UL (ref 1.4–6.5)
NEUTROPHILS NFR BLD AUTO: 79.1 % (ref 42–75)
PLATELET # BLD AUTO: 408 THOU/UL (ref 130–400)
POTASSIUM SERPL-SCNC: 4.9 MMOL/L (ref 3.5–5.1)
POTASSIUM SERPL-SCNC: 5 MMOL/L (ref 3.5–5.1)
RBC # BLD AUTO: 5.23 MILL/UL (ref 4.2–5.4)
SODIUM SERPL-SCNC: 128 MMOL/L (ref 136–145)
SODIUM SERPL-SCNC: 132 MMOL/L (ref 136–145)
WBC # BLD AUTO: 15.8 THOU/UL (ref 4.8–10.8)

## 2019-11-01 PROCEDURE — 80053 COMPREHEN METABOLIC PANEL: CPT

## 2019-11-01 PROCEDURE — 96374 THER/PROPH/DIAG INJ IV PUSH: CPT

## 2019-11-01 PROCEDURE — 96375 TX/PRO/DX INJ NEW DRUG ADDON: CPT

## 2019-11-01 PROCEDURE — 74018 RADEX ABDOMEN 1 VIEW: CPT

## 2019-11-01 PROCEDURE — 96361 HYDRATE IV INFUSION ADD-ON: CPT

## 2019-11-01 PROCEDURE — 83605 ASSAY OF LACTIC ACID: CPT

## 2019-11-01 PROCEDURE — 85025 COMPLETE CBC W/AUTO DIFF WBC: CPT

## 2020-07-14 ENCOUNTER — HOSPITAL ENCOUNTER (EMERGENCY)
Dept: HOSPITAL 92 - ERS | Age: 65
Discharge: HOME | End: 2020-07-14
Payer: SELF-PAY

## 2020-07-14 DIAGNOSIS — M81.0: ICD-10-CM

## 2020-07-14 DIAGNOSIS — Z79.899: ICD-10-CM

## 2020-07-14 DIAGNOSIS — I10: ICD-10-CM

## 2020-07-14 DIAGNOSIS — F41.9: ICD-10-CM

## 2020-07-14 DIAGNOSIS — M25.562: Primary | ICD-10-CM

## 2020-07-14 NOTE — RAD
XR Knee Lt 4 View STANDARD



HISTORY: Left knee pain



FINDINGS:

No fracture or dislocation is identified. No significant arthritic changes are seen.



Reported By: Nelson Aguilar 

Electronically Signed:  7/14/2020 9:49 AM

## 2020-07-31 ENCOUNTER — HOSPITAL ENCOUNTER (OUTPATIENT)
Dept: HOSPITAL 92 - SCSMRI | Age: 65
Discharge: HOME | End: 2020-07-31
Attending: ORTHOPAEDIC SURGERY
Payer: COMMERCIAL

## 2020-07-31 DIAGNOSIS — M17.12: ICD-10-CM

## 2020-07-31 DIAGNOSIS — S83.242A: Primary | ICD-10-CM

## 2020-07-31 DIAGNOSIS — M84.452A: ICD-10-CM

## 2020-07-31 NOTE — MRI
MRI OF THE LEFT KNEE WITHOUT CONTRAST: 

7/31/20

 

INDICATION:

History of left knee pain. 

 

COMPARISON: 

Left knee radiograph dated 7/14/20.

 

FINDINGS: 

There is a full thickness radial tear involving the posterior root of the medial meniscus with partia
l medial extrusion. There is some intrasubstance degenerative signal involving the posterior horn and
 body of the medial meniscus. The lateral meniscus is intact. 

 

The ACL, PCL, MCL and LCL are intact. The extensor mechanism is intact. 

 

There is a small subchondral insufficiency fracture involving the anterior central aspect of the medi
al femoral condyle on image 14 of series 5 with underlying subchondral edema. There is an area of mil
d chondrosis overlying this region.

 

Very small marginal osteophytes affecting all major compartments of the left knee. There is a moderat
e sized Baker's cyst.

 

IMPRESSION: 

1.      Mild osteoarthrosis left knee.

 

2.      Medial meniscal tear.

 

3.      Small subchondral insufficiency fracture involving the anterior medial femoral condyle with s
urrounding marrow edema. 

 

POS: [facility]

## 2020-08-24 ENCOUNTER — HOSPITAL ENCOUNTER (EMERGENCY)
Dept: HOSPITAL 18 - NAV ERS | Age: 65
Discharge: ANOTHER HOSPITAL | End: 2020-08-24
Payer: COMMERCIAL

## 2020-08-24 DIAGNOSIS — K56.609: Primary | ICD-10-CM

## 2020-08-24 DIAGNOSIS — I10: ICD-10-CM

## 2020-08-24 LAB
ALBUMIN SERPL BCG-MCNC: 4.3 G/DL (ref 3.4–4.8)
ALP SERPL-CCNC: 79 U/L (ref 40–110)
ALT SERPL W P-5'-P-CCNC: 25 U/L (ref 8–55)
ANION GAP SERPL CALC-SCNC: 19 MMOL/L (ref 10–20)
ANISOCYTOSIS BLD QL SMEAR: (no result) (100X)
AST SERPL-CCNC: 23 U/L (ref 5–34)
BACTERIA UR QL AUTO: (no result) HPF
BILIRUB SERPL-MCNC: 0.4 MG/DL (ref 0.2–1.2)
BUN SERPL-MCNC: 12 MG/DL (ref 9.8–20.1)
CALCIUM SERPL-MCNC: 11.4 MG/DL (ref 7.8–10.44)
CHLORIDE SERPL-SCNC: 98 MMOL/L (ref 98–107)
CK MB SERPL-MCNC: 1.9 NG/ML (ref 0–6.6)
CO2 SERPL-SCNC: 22 MMOL/L (ref 23–31)
CREAT CL PREDICTED SERPL C-G-VRATE: 0 ML/MIN (ref 70–130)
GLOBULIN SER CALC-MCNC: 4.9 G/DL (ref 2.4–3.5)
GLUCOSE SERPL-MCNC: 175 MG/DL (ref 80–115)
HGB BLD-MCNC: 15.5 G/DL (ref 12–16)
MCH RBC QN AUTO: 26.9 PG (ref 27–31)
MCV RBC AUTO: 90.3 FL (ref 78–98)
MDIFF COMPLETE?: YES
PLATELET # BLD AUTO: 280 THOU/UL (ref 130–400)
POTASSIUM SERPL-SCNC: 4.1 MMOL/L (ref 3.5–5.1)
PROT UR STRIP.AUTO-MCNC: 30 MG/DL
RBC # BLD AUTO: 5.76 MILL/UL (ref 4.2–5.4)
SODIUM SERPL-SCNC: 135 MMOL/L (ref 136–145)
SP GR UR STRIP: 1.02 (ref 1–1.04)
TARGETS BLD QL SMEAR: (no result) (100X)
WBC # BLD AUTO: 25.5 THOU/UL (ref 4.8–10.8)
WBC UR QL AUTO: (no result) HPF (ref 0–3)

## 2020-08-24 PROCEDURE — 85025 COMPLETE CBC W/AUTO DIFF WBC: CPT

## 2020-08-24 PROCEDURE — 96365 THER/PROPH/DIAG IV INF INIT: CPT

## 2020-08-24 PROCEDURE — 93005 ELECTROCARDIOGRAM TRACING: CPT

## 2020-08-24 PROCEDURE — 84484 ASSAY OF TROPONIN QUANT: CPT

## 2020-08-24 PROCEDURE — 80053 COMPREHEN METABOLIC PANEL: CPT

## 2020-08-24 PROCEDURE — 74177 CT ABD & PELVIS W/CONTRAST: CPT

## 2020-08-24 PROCEDURE — 83605 ASSAY OF LACTIC ACID: CPT

## 2020-08-24 PROCEDURE — 81015 MICROSCOPIC EXAM OF URINE: CPT

## 2020-08-24 PROCEDURE — 96361 HYDRATE IV INFUSION ADD-ON: CPT

## 2020-08-24 PROCEDURE — 96375 TX/PRO/DX INJ NEW DRUG ADDON: CPT

## 2020-08-24 PROCEDURE — 82553 CREATINE MB FRACTION: CPT

## 2020-08-24 PROCEDURE — 81003 URINALYSIS AUTO W/O SCOPE: CPT

## 2020-08-24 PROCEDURE — 96376 TX/PRO/DX INJ SAME DRUG ADON: CPT

## 2020-08-24 NOTE — CT
PRELIMINARY REPORT/[facility]/EMERGENCY AFTER HOURS PROCEDURE:

 

Receipt of this report by the clinical staff was confirmed with Sofía Dukes RN by Lala Fuentes
 on Aug 24, 2020 02:54:00 CDT.

 

Addendum electronically signed by Lala Fuentes on August 24, 2020 2:54:24 AM CDT

 

EXAM: CT ABDOMEN PELVIS W CON 

 

HISTORY: History of Crohn's disease, bowel obstructions, ileostomy, presents with abdominal cramping,
 vomiting, decreased ostomy output with concern for bowel obstruction. Patient states she has been vo
miting every 15 minutes since 9 PM. No output from the ileostomy since 17:00. 4" of SB removed last O
ct (2019). 

 

COMPARISON: None 

 

FINDINGS: 

Bibasilar atelectasis. 

Left basilar subsegmental atelectasis versus scarring. 

No pericardial effusion. 

No acute abnormality of the liver, gallbladder or pancreas. 

The spleen is absent, with residual splenule, or markedly diminutive. 

No renal or ureteral stone. 

No hydronephrosis or hydroureter. 

Postsurgical changes of prior colectomy. 

Multiple dilated loops of fluid-filled small bowel throughout the lower abdomen and pelvis.  No discr
ete transition point.  Small bowel within the ileostomy is decompressed. 

No pneumatosis or portal venous gas. 

No evidence for perforation or abscess. 

Carmen calcifications within the descending abdominal aorta. 

Right hip total arthroplasty hardware, incompletely visualized. 

Chronic posttraumatic change of the left superior pubic ramus. 

 

IMPRESSION: 

1.  Small bowel obstruction without discrete transition point versus ileus. 

2.  Extensive postsurgical changes.

 

 

 

ELECTRONICALLY SIGNED BY:

ZUNILDA Taylor MD

Aug 24, 2020 2:48:35 AM CDT

 

This report is intended for review by the ordering physician only, in accordance of law. If you recei
ve this report in error, please call [facility] at [phone].

 

 

 

 

FINAL REPORT 

 

CT ABDOMEN AND PELVIS:

The patient appears to be post colectomy.  There is an ileostomy in the right abdomen.  There is a pa
rastomal hernia.  There are dilated loops of fluid-filled small bowel in the mid abdomen.  Probable t
ransition zone in the right abdomen just proximal to the ostomy.  Liver, adrenal glands, and kidneys 
are unremarkable.  There are scattered enlarged mesenteric lymph nodes.  The patient appears to be po
st splenectomy with a splenule seen in the left upper quadrant.  

 

IMPRESSION: 

Evidence of small bowel obstruction.  I am in agreement with the preliminary report.

## 2021-04-13 ENCOUNTER — HOSPITAL ENCOUNTER (EMERGENCY)
Dept: HOSPITAL 18 - NAV ERS | Age: 66
LOS: 1 days | Discharge: HOME | End: 2021-04-14
Payer: COMMERCIAL

## 2021-04-13 DIAGNOSIS — Z79.899: ICD-10-CM

## 2021-04-13 DIAGNOSIS — E86.0: Primary | ICD-10-CM

## 2021-04-13 DIAGNOSIS — K52.9: ICD-10-CM

## 2021-04-13 DIAGNOSIS — I10: ICD-10-CM

## 2021-04-13 PROCEDURE — 83690 ASSAY OF LIPASE: CPT

## 2021-04-13 PROCEDURE — 74176 CT ABD & PELVIS W/O CONTRAST: CPT

## 2021-04-13 PROCEDURE — 85025 COMPLETE CBC W/AUTO DIFF WBC: CPT

## 2021-04-13 PROCEDURE — 83605 ASSAY OF LACTIC ACID: CPT

## 2021-04-13 PROCEDURE — 80053 COMPREHEN METABOLIC PANEL: CPT

## 2021-04-13 PROCEDURE — 96374 THER/PROPH/DIAG INJ IV PUSH: CPT

## 2021-04-14 LAB
ALBUMIN SERPL BCG-MCNC: 4.2 G/DL (ref 3.4–4.8)
ALP SERPL-CCNC: 88 U/L (ref 40–110)
ALT SERPL W P-5'-P-CCNC: 24 U/L (ref 8–55)
ANION GAP SERPL CALC-SCNC: 21 MMOL/L (ref 10–20)
AST SERPL-CCNC: 23 U/L (ref 5–34)
BILIRUB SERPL-MCNC: 0.8 MG/DL (ref 0.2–1.2)
BUN SERPL-MCNC: 13 MG/DL (ref 9.8–20.1)
CALCIUM SERPL-MCNC: 10.7 MG/DL (ref 7.8–10.44)
CHLORIDE SERPL-SCNC: 101 MMOL/L (ref 98–107)
CO2 SERPL-SCNC: 18 MMOL/L (ref 23–31)
CREAT CL PREDICTED SERPL C-G-VRATE: 0 ML/MIN (ref 70–130)
GLOBULIN SER CALC-MCNC: 5.2 G/DL (ref 2.4–3.5)
GLUCOSE SERPL-MCNC: 147 MG/DL (ref 80–115)
HGB BLD-MCNC: 13.6 G/DL (ref 12–16)
HGB BLD-MCNC: 15.7 G/DL (ref 12–16)
LIPASE SERPL-CCNC: 34 U/L (ref 8–78)
MANUAL DIFF??: YES
MANUAL DIFF??: YES
MCH RBC QN AUTO: 28.5 PG (ref 27–31)
MCH RBC QN AUTO: 29.3 PG (ref 27–31)
MCV RBC AUTO: 96.5 FL (ref 78–98)
MCV RBC AUTO: 98.1 FL (ref 78–98)
MDIFF COMPLETE?: YES
MDIFF COMPLETE?: YES
PLATELET # BLD AUTO: 224 THOU/UL (ref 130–400)
PLATELET # BLD AUTO: 265 THOU/UL (ref 130–400)
POTASSIUM SERPL-SCNC: 4.2 MMOL/L (ref 3.5–5.1)
RBC # BLD AUTO: 4.64 MILL/UL (ref 4.2–5.4)
RBC # BLD AUTO: 5.5 MILL/UL (ref 4.2–5.4)
SODIUM SERPL-SCNC: 136 MMOL/L (ref 136–145)
TARGETS BLD QL SMEAR: (no result) (100X)
WBC # BLD AUTO: 21.6 THOU/UL (ref 4.8–10.8)
WBC # BLD AUTO: 22.6 THOU/UL (ref 4.8–10.8)

## 2021-06-02 ENCOUNTER — HOSPITAL ENCOUNTER (OUTPATIENT)
Dept: HOSPITAL 92 - LABBT | Age: 66
Discharge: HOME | End: 2021-06-02
Attending: ORTHOPAEDIC SURGERY
Payer: MEDICARE

## 2021-06-02 DIAGNOSIS — Z01.818: Primary | ICD-10-CM

## 2021-06-02 DIAGNOSIS — M17.32: ICD-10-CM

## 2021-06-02 DIAGNOSIS — Z20.822: ICD-10-CM

## 2021-06-02 LAB
ANION GAP SERPL CALC-SCNC: 15 MMOL/L (ref 10–20)
BACTERIA UR QL AUTO: (no result) HPF
BUN SERPL-MCNC: 12 MG/DL (ref 9.8–20.1)
CALCIUM SERPL-MCNC: 9.2 MG/DL (ref 7.8–10.44)
CHLORIDE SERPL-SCNC: 108 MMOL/L (ref 98–107)
CO2 SERPL-SCNC: 18 MMOL/L (ref 23–31)
CREAT CL PREDICTED SERPL C-G-VRATE: 0 ML/MIN (ref 70–130)
GLUCOSE SERPL-MCNC: 110 MG/DL (ref 80–115)
HGB BLD-MCNC: 14.2 G/DL (ref 12–15.5)
INR PPP: 1
MCH RBC QN AUTO: 29.3 PG (ref 27–33)
MCV RBC AUTO: 93 FL (ref 81.6–98.3)
MDIFF COMPLETE?: YES
PLATELET # BLD AUTO: 253 10X3/UL (ref 150–450)
POTASSIUM SERPL-SCNC: 4.8 MMOL/L (ref 3.5–5.1)
PROTHROMBIN TIME: 11.2 SEC (ref 9.5–12.1)
RBC # BLD AUTO: 4.85 10X6/UL (ref 3.9–5.03)
RBC UR QL AUTO: (no result) HPF (ref 0–3)
SODIUM SERPL-SCNC: 136 MMOL/L (ref 136–145)
SP GR UR STRIP: 1.02 (ref 1–1.04)
WBC # BLD AUTO: 14.5 10X3/UL (ref 3.5–10.5)
WBC UR QL AUTO: (no result) HPF (ref 0–3)

## 2021-06-02 PROCEDURE — 85025 COMPLETE CBC W/AUTO DIFF WBC: CPT

## 2021-06-02 PROCEDURE — 85610 PROTHROMBIN TIME: CPT

## 2021-06-02 PROCEDURE — U0003 INFECTIOUS AGENT DETECTION BY NUCLEIC ACID (DNA OR RNA); SEVERE ACUTE RESPIRATORY SYNDROME CORONAVIRUS 2 (SARS-COV-2) (CORONAVIRUS DISEASE [COVID-19]), AMPLIFIED PROBE TECHNIQUE, MAKING USE OF HIGH THROUGHPUT TECHNOLOGIES AS DESCRIBED BY CMS-2020-01-R: HCPCS

## 2021-06-02 PROCEDURE — 80048 BASIC METABOLIC PNL TOTAL CA: CPT

## 2021-06-02 PROCEDURE — 93005 ELECTROCARDIOGRAM TRACING: CPT

## 2021-06-02 PROCEDURE — 93010 ELECTROCARDIOGRAM REPORT: CPT

## 2021-06-02 PROCEDURE — U0005 INFEC AGEN DETEC AMPLI PROBE: HCPCS

## 2021-06-02 PROCEDURE — 87081 CULTURE SCREEN ONLY: CPT

## 2021-06-02 PROCEDURE — 81001 URINALYSIS AUTO W/SCOPE: CPT

## 2021-06-07 ENCOUNTER — HOSPITAL ENCOUNTER (INPATIENT)
Dept: HOSPITAL 92 - SDC | Age: 66
LOS: 2 days | Discharge: HOME | DRG: 470 | End: 2021-06-09
Attending: ORTHOPAEDIC SURGERY | Admitting: ORTHOPAEDIC SURGERY
Payer: MEDICARE

## 2021-06-07 VITALS — BODY MASS INDEX: 28 KG/M2

## 2021-06-07 DIAGNOSIS — F41.9: ICD-10-CM

## 2021-06-07 DIAGNOSIS — E03.9: ICD-10-CM

## 2021-06-07 DIAGNOSIS — Z20.822: ICD-10-CM

## 2021-06-07 DIAGNOSIS — K50.90: ICD-10-CM

## 2021-06-07 DIAGNOSIS — M17.12: Primary | ICD-10-CM

## 2021-06-07 DIAGNOSIS — Z79.899: ICD-10-CM

## 2021-06-07 DIAGNOSIS — I10: ICD-10-CM

## 2021-06-07 DIAGNOSIS — Z96.641: ICD-10-CM

## 2021-06-07 DIAGNOSIS — M85.80: ICD-10-CM

## 2021-06-07 DIAGNOSIS — E78.5: ICD-10-CM

## 2021-06-07 DIAGNOSIS — Z85.89: ICD-10-CM

## 2021-06-07 PROCEDURE — C1776 JOINT DEVICE (IMPLANTABLE): HCPCS

## 2021-06-07 PROCEDURE — 8E0YXBZ COMPUTER ASSISTED PROCEDURE OF LOWER EXTREMITY: ICD-10-PCS | Performed by: ORTHOPAEDIC SURGERY

## 2021-06-07 PROCEDURE — 0SRD0J9 REPLACEMENT OF LEFT KNEE JOINT WITH SYNTHETIC SUBSTITUTE, CEMENTED, OPEN APPROACH: ICD-10-PCS | Performed by: ORTHOPAEDIC SURGERY

## 2021-06-07 PROCEDURE — 85027 COMPLETE CBC AUTOMATED: CPT

## 2021-06-07 PROCEDURE — 36415 COLL VENOUS BLD VENIPUNCTURE: CPT

## 2021-06-07 PROCEDURE — C1713 ANCHOR/SCREW BN/BN,TIS/BN: HCPCS

## 2021-06-07 RX ADMIN — DOCUSATE SODIUM 50 MG AND SENNOSIDES 8.6 MG SCH: 8.6; 5 TABLET, FILM COATED ORAL at 15:33

## 2021-06-07 RX ADMIN — ASPIRIN SCH MG: 81 TABLET ORAL at 16:39

## 2021-06-07 RX ADMIN — DOCUSATE SODIUM 50 MG AND SENNOSIDES 8.6 MG SCH TAB: 8.6; 5 TABLET, FILM COATED ORAL at 20:40

## 2021-06-07 RX ADMIN — ASPIRIN SCH MG: 81 TABLET ORAL at 20:37

## 2021-06-07 RX ADMIN — MULTIPLE VITAMINS W/ MINERALS TAB SCH: TAB at 15:33

## 2021-06-08 LAB
HGB BLD-MCNC: 11.5 G/DL (ref 12–16)
MCH RBC QN AUTO: 31 PG (ref 27–31)
MCV RBC AUTO: 95.7 FL (ref 78–98)
PLATELET # BLD AUTO: 225 THOU/UL (ref 130–400)
RBC # BLD AUTO: 3.73 MILL/UL (ref 4.2–5.4)
WBC # BLD AUTO: 27.6 THOU/UL (ref 4.8–10.8)

## 2021-06-08 RX ADMIN — ASPIRIN SCH MG: 81 TABLET ORAL at 08:57

## 2021-06-08 RX ADMIN — MULTIPLE VITAMINS W/ MINERALS TAB SCH TAB: TAB at 08:58

## 2021-06-08 RX ADMIN — DOCUSATE SODIUM 50 MG AND SENNOSIDES 8.6 MG SCH: 8.6; 5 TABLET, FILM COATED ORAL at 21:51

## 2021-06-08 RX ADMIN — HYDROCODONE BITARTRATE AND ACETAMINOPHEN PRN TAB: 10; 325 TABLET ORAL at 23:26

## 2021-06-08 RX ADMIN — ASPIRIN SCH MG: 81 TABLET ORAL at 21:49

## 2021-06-08 RX ADMIN — DOCUSATE SODIUM 50 MG AND SENNOSIDES 8.6 MG SCH: 8.6; 5 TABLET, FILM COATED ORAL at 08:50

## 2021-06-09 VITALS — DIASTOLIC BLOOD PRESSURE: 69 MMHG | SYSTOLIC BLOOD PRESSURE: 119 MMHG | TEMPERATURE: 97.9 F

## 2021-06-09 RX ADMIN — DOCUSATE SODIUM 50 MG AND SENNOSIDES 8.6 MG SCH: 8.6; 5 TABLET, FILM COATED ORAL at 08:47

## 2021-06-09 RX ADMIN — HYDROCODONE BITARTRATE AND ACETAMINOPHEN PRN TAB: 10; 325 TABLET ORAL at 08:47

## 2021-06-09 RX ADMIN — MULTIPLE VITAMINS W/ MINERALS TAB SCH TAB: TAB at 08:47

## 2021-06-09 RX ADMIN — ASPIRIN SCH MG: 81 TABLET ORAL at 08:46

## 2021-06-09 RX ADMIN — HYDROCODONE BITARTRATE AND ACETAMINOPHEN PRN TAB: 10; 325 TABLET ORAL at 13:33

## 2021-12-22 ENCOUNTER — HOSPITAL ENCOUNTER (OUTPATIENT)
Dept: HOSPITAL 92 - CSHMAMMO | Age: 66
Discharge: HOME | End: 2021-12-22
Payer: MEDICARE

## 2021-12-22 DIAGNOSIS — Z12.31: Primary | ICD-10-CM

## 2021-12-22 PROCEDURE — 77063 BREAST TOMOSYNTHESIS BI: CPT

## 2021-12-22 PROCEDURE — 77067 SCR MAMMO BI INCL CAD: CPT

## 2022-12-19 ENCOUNTER — HOSPITAL ENCOUNTER (OUTPATIENT)
Dept: HOSPITAL 92 - CSHWCC | Age: 67
Discharge: HOME | End: 2022-12-19
Attending: NURSE PRACTITIONER
Payer: MEDICARE

## 2022-12-19 DIAGNOSIS — L23.9: Primary | ICD-10-CM

## 2022-12-19 PROCEDURE — G0463 HOSPITAL OUTPT CLINIC VISIT: HCPCS

## 2022-12-19 PROCEDURE — 99203 OFFICE O/P NEW LOW 30 MIN: CPT

## 2022-12-19 PROCEDURE — 97139 UNLISTED THERAPEUTIC PX: CPT

## 2022-12-28 ENCOUNTER — HOSPITAL ENCOUNTER (OUTPATIENT)
Dept: HOSPITAL 92 - CSHMAMMO | Age: 67
Discharge: HOME | End: 2022-12-28
Attending: FAMILY MEDICINE
Payer: MEDICARE

## 2022-12-28 DIAGNOSIS — Z12.31: Primary | ICD-10-CM

## 2022-12-28 PROCEDURE — 77067 SCR MAMMO BI INCL CAD: CPT

## 2022-12-28 PROCEDURE — 77063 BREAST TOMOSYNTHESIS BI: CPT

## 2023-01-09 ENCOUNTER — HOSPITAL ENCOUNTER (OUTPATIENT)
Dept: HOSPITAL 92 - CSHWCC | Age: 68
Discharge: HOME | End: 2023-01-09
Attending: NURSE PRACTITIONER
Payer: MEDICARE

## 2023-01-09 DIAGNOSIS — L23.9: Primary | ICD-10-CM

## 2023-01-09 PROCEDURE — G0463 HOSPITAL OUTPT CLINIC VISIT: HCPCS

## 2023-01-09 PROCEDURE — 97139 UNLISTED THERAPEUTIC PX: CPT

## 2023-01-09 PROCEDURE — 99212 OFFICE O/P EST SF 10 MIN: CPT

## 2023-06-25 NOTE — RAD
KUB



INDICATION: History of nausea and vomiting



COMPARISON: Prior CT the abdomen and pelvis dated October 7, 2019 and acute abdominal series dated Ju
ly 24, 2019



FINDINGS:



Bowel gas: Nonspecific but without overt appearance of obstruction. There is a right lower quadrant o
stomy site.



Lung bases: Clear.



Additional findings: There are numerous surgical clips within the lower pelvis which is stable. There
 is a right total hip prosthesis in place.



Osseous structures: No acute osseous abnormality is demonstrated. There is scattered degenerative and
 osteoarthritic change present.



IMPRESSION:

1. No acute abnormality.



Reported By: Nico Goncalves 

Electronically Signed:  11/1/2019 12:04 PM 6 (moderate pain)

## 2023-12-29 ENCOUNTER — HOSPITAL ENCOUNTER (OUTPATIENT)
Dept: HOSPITAL 92 - CSHMAMMO | Age: 68
Discharge: HOME | End: 2023-12-29
Attending: FAMILY MEDICINE
Payer: MEDICARE

## 2023-12-29 DIAGNOSIS — Z12.31: Primary | ICD-10-CM

## 2023-12-29 PROCEDURE — 77067 SCR MAMMO BI INCL CAD: CPT

## 2023-12-29 PROCEDURE — 77063 BREAST TOMOSYNTHESIS BI: CPT

## 2024-12-30 ENCOUNTER — HOSPITAL ENCOUNTER (OUTPATIENT)
Dept: HOSPITAL 92 - CSHMAMMO | Age: 69
Discharge: HOME | End: 2024-12-30
Attending: FAMILY MEDICINE
Payer: MEDICARE

## 2024-12-30 DIAGNOSIS — Z12.31: Primary | ICD-10-CM

## 2024-12-30 PROCEDURE — 77063 BREAST TOMOSYNTHESIS BI: CPT

## 2024-12-30 PROCEDURE — 77067 SCR MAMMO BI INCL CAD: CPT
